# Patient Record
Sex: FEMALE | Race: AMERICAN INDIAN OR ALASKA NATIVE
[De-identification: names, ages, dates, MRNs, and addresses within clinical notes are randomized per-mention and may not be internally consistent; named-entity substitution may affect disease eponyms.]

---

## 2019-11-20 ENCOUNTER — HOSPITAL ENCOUNTER (OUTPATIENT)
Dept: HOSPITAL 5 - SPVWC | Age: 84
Discharge: HOME | End: 2019-11-20
Attending: FAMILY MEDICINE
Payer: MEDICARE

## 2019-11-20 DIAGNOSIS — Z78.0: Primary | ICD-10-CM

## 2019-11-20 DIAGNOSIS — Z91.89: ICD-10-CM

## 2019-11-20 PROCEDURE — 77080 DXA BONE DENSITY AXIAL: CPT

## 2019-11-21 NOTE — MAMMOGRAPHY REPORT
BONE DEXA



CLINICAL: Postmenopausal. No comparison.



TECHNIQUE: 2 site bone DEXA performed on an Hologic scanner.



FINDINGS:



The average BMD of the lumbar spine L1-L4 is 0.964g/cm squared with a T score of -1.7.



The average total BMD of the left hip is 0.782 g/cm squared with a T score of -1.6. 



Note: Z scores are not calculated if the age is greater than 80 years.



IMPRESSION:

1. WHO classification: Osteopenia with increased fracture risk based on spine measurements.

2. WHO classification Osteopenia with increased fracture risk based on left hip measurements.



RECOMMENDATION: Clinical correlation and routine screening.



Definitions:

BMD equal bone mineral density

T score = BMD related to peak bone mass of young adult ([place] expressed an standard deviation)

Z score = age-matched BMD expressed in SD

World health organization (WHO) diagnostic criteria

Normal T score greater than equal to 1 standard deviation

Osteopenia T score between -1 and -2.4 standard deviation

Osteoporosis T score -2.5 standard deviation or below.



Note: BMD is not the only risk factor for fracture; also consider factors such as the patient's age, 
risk of falling, previous osteoporotic fracture, family history of osteoporotic fractures, current sm
oker and low body weight.



Z scores are not calculated if greater than 80 years of age.



Signer Name: Jun Lechuga MD 

Signed: 11/21/2019 9:45 AM

 Workstation Name: [id number]